# Patient Record
Sex: FEMALE | Race: WHITE | NOT HISPANIC OR LATINO | Employment: OTHER | URBAN - METROPOLITAN AREA
[De-identification: names, ages, dates, MRNs, and addresses within clinical notes are randomized per-mention and may not be internally consistent; named-entity substitution may affect disease eponyms.]

---

## 2024-09-05 ENCOUNTER — TELEPHONE (OUTPATIENT)
Dept: SURGERY | Facility: CLINIC | Age: 39
End: 2024-09-05
Payer: COMMERCIAL

## 2024-09-05 DIAGNOSIS — K82.4 GALLBLADDER POLYP: Primary | ICD-10-CM

## 2024-09-09 RX ORDER — AMOXICILLIN AND CLAVULANATE POTASSIUM 875; 125 MG/1; MG/1
1 TABLET, FILM COATED ORAL 2 TIMES DAILY
COMMUNITY
Start: 2024-08-27

## 2024-09-10 NOTE — PROGRESS NOTES
HISTORY & PHYSICAL  General Surgery    Patient Name: Holly Davis  YOB: 1985    Date: 09/10/2024                   PRESENTING HISTORY     Chief Complaint/Reason for Admission: No chief complaint on file.   Referral from Dr. Scott.  Gallbladder polyp.    History of Present Illness:  Ms. Holly Davis is a 39 y.o. female who reports she has been experiencing postprandial right upper quadrant pain associated with fatty meals. Associated symptoms include nausea but denies emesis. She denies CP/SOB/fever/chills.     Review of Systems:  12 point ROS negative except as stated in HPI    PAST HISTORY:     Past Medical History:   Diagnosis Date    Gallbladder polyp     Liver mass      No past surgical history on file.  No family history on file.  Social History     Socioeconomic History    Marital status:    Tobacco Use    Smoking status: Former     Types: Cigarettes    Smokeless tobacco: Never     Social Determinants of Health     Financial Resource Strain: Patient Declined (9/25/2023)    [facility]    Overall Financial Resource Strain (CARDIA)     Difficulty of Paying Living Expenses: Patient declined   Food Insecurity: Patient Declined (9/25/2023)    Received from [facility] and Its Subsidiaries and Affiliates    Hunger Vital Sign     Worried About Running Out of Food in the Last Year: Patient declined     Ran Out of Food in the Last Year: Patient declined   Transportation Needs: Patient Declined (9/25/2023)    Received from [facility] and Its Subsidiaries and Affiliates    PRAPARE - Transportation     Lack of Transportation (Medical): Patient declined     Lack of Transportation (Non-Medical): Patient declined   Physical Activity: Sufficiently Active (9/25/2023)    Received from [facility]  and Its Subsidiaries and Affiliates    Exercise Vital Sign     Days of Exercise per Week: 7 days     Minutes of Exercise per Session: 120 min   Stress: No Stress Concern Present (9/25/2023)    Received from [facility] and Its Subsidiaries and Affiliates    [facility] [place] [facility] - Occupational Stress Questionnaire     Feeling of Stress : Only a little       MEDICATIONS & ALLERGIES:     Current Outpatient Medications on File Prior to Visit   Medication Sig    amoxicillin-clavulanate 875-125mg (AUGMENTIN) 875-125 mg per tablet Take 1 tablet by mouth 2 (two) times daily.     No current facility-administered medications on file prior to visit.     Review of patient's allergies indicates:  No Known Allergies    OBJECTIVE:   There were no vitals taken for this visit.    Physical Exam:  General:  Well developed, well nourished, no acute distress  HEENT:  Normocephalic, atraumatic, PERRL, EOMI, clear sclera, ears normal, neck supple, throat clear without erythema or exudates  CVS:  RRR, S1 and S2 normal, no murmurs, rubs, gallops  Resp:  Lungs clear to auscultation, no wheezes, rales, rhonchi, cough  GI:  Abdomen soft, non-tender, non-distended, normoactive bowel sounds, no masses  :  Deferred  MSK:  No muscle atrophy, cyanosis, peripheral edema, full range of motion  Skin:  No rashes, ulcers, erythema  Neuro:  CNII-XII grossly intact  Psych:  Alert and oriented to person, place, and time    Results:  I have independently reviewed all pertinent lab and radiologic studies (US) relevant to general/bariatric surgery.    US - 8/26/24  Stable solitary hyperechoic liver lesion measuring 1.7cm, sonographically typical for benign.  Tiny gallbladder polyp again noted measuring just under 4mm.  No sludge or cholelithiasis identified.    MRI - 8/8/2023  Solid T2 hyperintense lesion measuring approximately 1.5cm within segment VII of the right lobe corresponding to the  incidental hyperechoic lesion on previous ultrasound.  The lesion is near completely nonenhancing, most consistent with scleorsed (AKA hyalinized) hemangioma.    Noncirrhotic and nonsteatotic appearing liver.  No additional or suspicious liver lesions identified.  VISIT DIAGNOSES:       ICD-10-CM ICD-9-CM   1. Preop examination  Z01.818 V72.84        ASSESSMENT/PLAN:   Ms. Holly Davis is a 39 y.o. female with symptomatic cholelithiasis (chronic with acute exacerbation)    Risks / Benefits of surgery was discussed with the patient who voiced understanding and wishes to proceed.     - Laparoscopic Cholecystectomy   - Preoperative workup

## 2024-09-16 ENCOUNTER — OFFICE VISIT (OUTPATIENT)
Dept: SURGERY | Facility: CLINIC | Age: 39
End: 2024-09-16
Payer: COMMERCIAL

## 2024-09-16 ENCOUNTER — HOSPITAL ENCOUNTER (OUTPATIENT)
Dept: RADIOLOGY | Facility: HOSPITAL | Age: 39
Discharge: HOME | End: 2024-09-16
Attending: SURGERY
Payer: COMMERCIAL

## 2024-09-16 VITALS — WEIGHT: 183.38 LBS | HEART RATE: 72 BPM | DIASTOLIC BLOOD PRESSURE: 89 MMHG | SYSTOLIC BLOOD PRESSURE: 143 MMHG

## 2024-09-16 DIAGNOSIS — K82.4 GALLBLADDER POLYP: ICD-10-CM

## 2024-09-16 DIAGNOSIS — Z01.818 PREOP EXAMINATION: Primary | ICD-10-CM

## 2024-09-16 DIAGNOSIS — K82.4 GALLBLADDER POLYP: Primary | ICD-10-CM

## 2024-09-16 DIAGNOSIS — Z01.818 PREOP EXAMINATION: ICD-10-CM

## 2024-09-16 PROCEDURE — 71045 X-RAY EXAM CHEST 1 VIEW: CPT | Mod: TC

## 2024-09-16 PROCEDURE — 99204 OFFICE O/P NEW MOD 45 MIN: CPT | Mod: ,,, | Performed by: SURGERY

## 2024-10-01 NOTE — PROGRESS NOTES
HISTORY & PHYSICAL  General Surgery    Patient Name: Holly Davis  YOB: 1985    Date: 10/01/2024                   PRESENTING HISTORY     Chief Complaint/Reason for Admission: No chief complaint on file.  Referral from Dr. Gonzalez for Gallbladder polyp. Laparoscopic Cholecystectomy currently scheduled for 10/10/24.  Requests visit, has more questions.    History of Present Illness:  Ms. Holly Davis is a 39 y.o. female who reports she has been experiencing postprandial right upper quadrant pain associated with fatty meals. Associated symptoms include nausea but denies emesis. She denies CP/SOB/fever/chills.     Update:   The patient is here for additional questions    Review of Systems:  12 point ROS negative except as stated in HPI    PAST HISTORY:     Past Medical History:   Diagnosis Date    Gallbladder polyp     Liver mass      No past surgical history on file.  Family History   Family history unknown: Yes     Social History     Socioeconomic History    Marital status:    Tobacco Use    Smoking status: Former     Types: Cigarettes    Smokeless tobacco: Never   Substance and Sexual Activity    Alcohol use: Yes     Alcohol/week: 3.0 standard drinks of alcohol     Types: 3 Glasses of wine per week    Drug use: Never    Sexual activity: Yes     Partners: Male     Birth control/protection: Partner-Vasectomy     Social Drivers of Health     Financial Resource Strain: Patient Declined (9/24/2024)    Received from [facility] and Its Subsidiaries and Affiliates    Overall Financial Resource Strain (CARDIA)     Difficulty of Paying Living Expenses: Patient declined   Food Insecurity: Patient Declined (9/24/2024)    Received from [facility] and Its Subsidiaries and Affiliates    Hunger Vital Sign     Worried About Running Out of Food in the Last Year: Patient declined     Ran Out of Food in the Last Year: Patient  declined   Transportation Needs: Patient Declined (9/24/2024)    [facility]    PRAPARE - Transportation     Lack of Transportation (Medical): Patient declined     Lack of Transportation (Non-Medical): Patient declined   Physical Activity: Patient Declined (9/24/2024)    [facility]    Exercise Vital Sign     Days of Exercise per Week: Patient declined     Minutes of Exercise per Session: Patient declined   Stress: Patient Declined (9/24/2024)    [facility]    [place] [facility] [employer] - Occupational Stress Questionnaire     Feeling of Stress : Patient declined   Housing Stability: Patient Declined (9/24/2024)    Received from [facility] and [facility]ates    Housing Stability Vital Sign     Unable to Pay for Housing in the Last Year: Patient declined     Homeless in the Last Year: Patient declined       MEDICATIONS & ALLERGIES:     No current outpatient medications on file prior to visit.     No current facility-administered medications on file prior to visit.     Review of patient's allergies indicates:  No Known Allergies    OBJECTIVE:   There were no vitals taken for this visit.    Physical Exam:  General:  Well developed, well nourished, no acute distress  HEENT:  Normocephalic, atraumatic, PERRL, EOMI, clear sclera, ears normal, neck supple, throat clear without erythema or exudates  CVS:  RRR, S1 and S2 normal, no murmurs, rubs, gallops  Resp:  Lungs clear to auscultation, no wheezes, rales, rhonchi, cough  GI:  Abdomen soft, non-tender, non-distended, normoactive bowel sounds, no masses  :  Deferred  MSK:  No muscle atrophy, cyanosis, peripheral edema, full range of  motion  Skin:  No rashes, ulcers, erythema  Neuro:  CNII-XII grossly intact  Psych:  Alert and oriented to person, place, and time    Results:  I have independently reviewed all pertinent lab and radiologic studies (US) relevant to general/bariatric surgery.    US - 8/26/24  Stable solitary hyperechoic liver lesion measuring 1.7cm, sonographically typical for benign.  Tiny gallbladder polyp again noted measuring just under 4mm.  No sludge or cholelithiasis identified.     MRI - 8/8/2023  Solid T2 hyperintense lesion measuring approximately 1.5cm within segment VII of the right lobe corresponding to the incidental hyperechoic lesion on previous ultrasound.  The lesion is near completely nonenhancing, most consistent with scleorsed (AKA hyalinized) hemangioma.    Noncirrhotic and nonsteatotic appearing liver.  No additional or suspicious liver lesions identified.    VISIT DIAGNOSES:     No diagnosis found.     ASSESSMENT/PLAN:   Ms. Holly Davis is a 39 y.o. female with symptomatic cholelithiasis / gallbladder polyp (chronic with acute exacerbation)    -  The patient at this time states she does not want surgery.  She reports that she has performed her own research and states her incidence of cancer is low and is willing to take that risk.  We had a discussion that if she chooses to defer then we should manage with repeat ultrasound to monitor.  -  Repeat u/s in 6 wks  -  RTC after above

## 2024-10-02 ENCOUNTER — OFFICE VISIT (OUTPATIENT)
Dept: SURGERY | Facility: CLINIC | Age: 39
End: 2024-10-02
Payer: COMMERCIAL

## 2024-10-02 VITALS
BODY MASS INDEX: 27.74 KG/M2 | DIASTOLIC BLOOD PRESSURE: 82 MMHG | SYSTOLIC BLOOD PRESSURE: 134 MMHG | WEIGHT: 183 LBS | HEIGHT: 68 IN

## 2024-10-02 DIAGNOSIS — K82.4 GALLBLADDER POLYP: Primary | ICD-10-CM

## 2024-10-02 DIAGNOSIS — Z01.818 PREOP EXAMINATION: ICD-10-CM

## 2024-12-09 ENCOUNTER — TELEPHONE (OUTPATIENT)
Dept: SURGERY | Facility: CLINIC | Age: 39
End: 2024-12-09
Payer: COMMERCIAL

## 2024-12-09 DIAGNOSIS — R10.9 ABDOMINAL PAIN, UNSPECIFIED ABDOMINAL LOCATION: Primary | ICD-10-CM
